# Patient Record
Sex: MALE | Race: WHITE | NOT HISPANIC OR LATINO | URBAN - METROPOLITAN AREA
[De-identification: names, ages, dates, MRNs, and addresses within clinical notes are randomized per-mention and may not be internally consistent; named-entity substitution may affect disease eponyms.]

---

## 2020-01-31 ENCOUNTER — OFFICE VISIT CONVERTED (OUTPATIENT)
Dept: UROLOGY | Facility: CLINIC | Age: 35
End: 2020-01-31
Attending: UROLOGY

## 2020-02-06 ENCOUNTER — HOSPITAL ENCOUNTER (OUTPATIENT)
Dept: GENERAL RADIOLOGY | Facility: HOSPITAL | Age: 35
Discharge: HOME | End: 2020-02-06
Attending: UROLOGY

## 2020-07-22 ENCOUNTER — OFFICE VISIT CONVERTED (OUTPATIENT)
Dept: SURGERY | Facility: CLINIC | Age: 35
End: 2020-07-22
Attending: NURSE PRACTITIONER

## 2020-09-21 ENCOUNTER — HOSPITAL ENCOUNTER (OUTPATIENT)
Dept: PREADMISSION TESTING | Facility: HOSPITAL | Age: 35
Discharge: HOME | End: 2020-09-21
Attending: SURGERY

## 2020-09-22 LAB — SARS-COV-2 RNA SPEC QL NAA+PROBE: NOT DETECTED

## 2020-09-24 ENCOUNTER — HOSPITAL ENCOUNTER (OUTPATIENT)
Dept: GASTROENTEROLOGY | Facility: HOSPITAL | Age: 35
Setting detail: HOSPITAL OUTPATIENT SURGERY
Discharge: HOME | End: 2020-09-24
Attending: SURGERY

## 2020-12-31 ENCOUNTER — HOSPITAL ENCOUNTER (OUTPATIENT)
Dept: URGENT CARE | Facility: CLINIC | Age: 35
Discharge: HOME | End: 2020-12-31
Attending: PHYSICIAN ASSISTANT

## 2021-01-01 LAB — SARS-COV-2 RNA SPEC QL NAA+PROBE: NOT DETECTED

## 2021-05-10 NOTE — H&P
History and Physical      Patient Name: Cliff Cox   Patient ID: [id number]   Sex: Male   YOB: 1985    Primary Care Provider: Gabbi Johnson   Referring Provider: Jonathon Henning PA-C    Visit Date: July 22, 2020    Provider: AMY Archibald   [facility]   Location Address: [address] [place] [address]  [id number]   Location Phone: [phone]          Chief Complaint  · Requesting colonoscopy  · FH of colon cancer  · Here today for a pre-surgical colon screening visit      History Of Present Illness  The patient is a 34 year old /White male presenting [facility] the [facility] on a referral from Jonathon Henning PA-C.   Cliff Cox needs to have a screening colonoscopy.   Patient states that they have had a colonoscopy. 5 years ago   Patient currently complains of: no complaints   Patient Does have family history of colon cancer. Grandmother      Patient presents today on a referral from [facility] for a screening colonoscopy.  Patient denies any abdominal pain, diarrhea, or rectal bleeding.  Patient admits to family history of colon cancer with his grandmother.    Patient has a personal history of testicular cancer and is under the care of Dr. Curt Serrano.    6/2015 - Colonoscopy ([facility]): Normal colon.       Past Medical History  Disease Name Date Onset Notes   Ringing in ears --  --    Sinus trouble --  --    Testicular Cancer --  --    Testicular cancer, right 04/28/2015 --          Past Surgical History  Procedure Name Date Notes   Arthrocentesis, AC joint 2013 --    orchiectomy 2015 --    shoulder repair --  --    Dennard Tooth Extraction --  --          Allergy List  Allergen Name Date Reaction Notes   NO KNOWN DRUG ALLERGIES --  --  --        Allergies Reconciled  Family Medical History  Disease Name Relative/Age Notes   Colon Neoplasm  --          Social History  Finding Status Start/Stop Quantity Notes   Tobacco Former --/-- --  quit  "2012         Review of Systems  · Constitutional  o Denies  o : fever, chills  · Eyes  o Denies  o : yellowish discoloration of eyes  · HENT  o Denies  o : difficulty swallowing  · Cardiovascular  o Denies  o : chest pain, chest pain on exertion  · Respiratory  o Denies  o : shortness of breath  · Gastrointestinal  o Denies  o : nausea, vomiting, diarrhea, constipation  · Genitourinary  o Denies  o : abnormal color of urine  · Integument  o Denies  o : rash  · Neurologic  o Denies  o : tingling or numbness  · Musculoskeletal  o Denies  o : joint pain  · Endocrine  o Denies  o : weight gain, weight loss      Vitals  Date Time BP Position Site L\R Cuff Size HR RR TEMP (F) WT  HT  BMI kg/m2 BSA m2 O2 Sat        07/22/2020 09:43 AM       18  266lbs 2oz 6'  2\" 34.17 2.51           Physical Examination  · Constitutional  o Appearance  o : well developed, well-nourished, patient in no apparent distress  · Head and Face  o Head  o :   § Inspection  § : atraumatic, normocephalic  o Face  o :   § Inspection  § : no facial lesions  · Eyes  o Conjunctivae  o : conjunctivae normal  o Sclerae  o : sclerae white  · Neck  o Inspection/Palpation  o : normal appearance, no masses or tenderness, trachea midline  · Respiratory  o Respiratory Effort  o : breathing unlabored  · Skin and Subcutaneous Tissue  o General Inspection  o : no lesions present, no areas of discoloration, skin turgor normal, texture normal  · Neurologic  o Mental Status Examination  o :   § Orientation  § : grossly oriented to person, place and time  § Attention  § : attention normal, concentration abilities normal  § Fund of Knowledge  § : fund of knowledge within normal limits, patient aware of current events  o Gait and Station  o : normal gait, able to stand without difficulty  · Psychiatric  o Judgement and Insight  o : judgment and insight intact  o Mood and Affect  o : mood normal, affect appropriate          Assessment  · Family History of Colon " Cancer     V16.0/Z80.0  · Screening for colon cancer     V76.51/Z12.11  · Pre-op testing     V72.84/Z01.818      Plan  · Orders  o Consent for Colonoscopy Screening-Possible risk/complications, benefits, and alternatives to surgical or invasive procedure have been explained to patient and/or legal guardian. -Patient has been evaluated and can tolerate anesthesia and/or sedation. Risks, benefits, and alternatives to anesthesia and sedation have been explained to patient and/or legal guardian. () - V76.51/Z12.11, V16.0/Z80.0 - 09/24/2020  o [facility] Pre-Op Covid-19 Screening (88910) - V72.84/Z01.818 - 09/18/2020  · Medications  o Medications have been Reconciled  o Transition of Care or Provider Policy  · Instructions  o Surgical Facility: [facility]  o Handouts Provided Pre-Procedure Instructions including date, time, and location of procedure.   o PLAN: Proceeed with colonoscopy. Patient understands risks/benefits and is willing to proceed.   o ***Surgical Orders***  o RISK AND BENEFITS:  o Given these options, the patient has verbally expressed an understanding of the risks of the surgery and finds these risks acceptable. Will proceed with surgery as soon as possible.  o O.R. PREP: Per protocol   o IV: Per Anesthesia  o Please sign permit for: Colonoscopy with possible biopsies by Dr. Huerta.  o The above History and Physical Examination has been completed within 30 days of admission.  o ***Patient Status***  o Outpatient  o Follow up in the in the office post procedure.  o Advised patient he would need COVID-19 testing prior to procedure educated patient he needs self isolate in between testing procedure. Patient verbalizes understanding is willing to proceed.  o Electronically Identified Patient Education Materials Provided Electronically  · Disposition  o Call or Return if symptoms worsen or persist.            Electronically Signed by: AMY Archibald -Author on July 22, 2020 12:58:28 PM

## 2021-05-15 VITALS — BODY MASS INDEX: 34.39 KG/M2 | HEIGHT: 74 IN | RESPIRATION RATE: 14 BRPM | WEIGHT: 268 LBS

## 2021-05-15 VITALS — RESPIRATION RATE: 16 BRPM | HEIGHT: 74 IN | WEIGHT: 266.12 LBS | BODY MASS INDEX: 34.15 KG/M2

## 2021-08-19 ENCOUNTER — TELEPHONE (OUTPATIENT)
Dept: SURGERY | Facility: CLINIC | Age: 36
End: 2021-08-19

## 2021-08-19 NOTE — TELEPHONE ENCOUNTER
[facility] called needing office note that released pt from testicular cancer. Fax # [phone] ATTN: Patti or Julissa